# Patient Record
Sex: MALE | Race: WHITE | Employment: FULL TIME | URBAN - METROPOLITAN AREA
[De-identification: names, ages, dates, MRNs, and addresses within clinical notes are randomized per-mention and may not be internally consistent; named-entity substitution may affect disease eponyms.]

---

## 2019-05-21 ENCOUNTER — HOSPITAL ENCOUNTER (EMERGENCY)
Age: 69
Discharge: HOME | End: 2019-05-21
Attending: EMERGENCY MEDICINE
Payer: MEDICARE

## 2019-05-21 VITALS
TEMPERATURE: 98 F | DIASTOLIC BLOOD PRESSURE: 51 MMHG | HEART RATE: 72 BPM | SYSTOLIC BLOOD PRESSURE: 104 MMHG | WEIGHT: 206 LBS | BODY MASS INDEX: 31.22 KG/M2 | OXYGEN SATURATION: 99 % | RESPIRATION RATE: 16 BRPM | HEIGHT: 68 IN

## 2019-05-21 DIAGNOSIS — H65.00 ACUTE SEROUS OTITIS MEDIA, RECURRENCE NOT SPECIFIED, UNSPECIFIED LATERALITY: Primary | ICD-10-CM

## 2019-05-21 PROCEDURE — 99282 EMERGENCY DEPT VISIT SF MDM: CPT

## 2019-05-21 RX ORDER — AMOXICILLIN 250 MG/1
500 CAPSULE ORAL 3 TIMES DAILY
Qty: 30 CAPSULE | Refills: 0 | Status: SHIPPED | OUTPATIENT
Start: 2019-05-21 | End: 2019-05-31

## 2019-05-21 RX ORDER — DOXYCYCLINE 50 MG/1
CAPSULE ORAL DAILY
COMMUNITY

## 2019-05-21 RX ORDER — LISINOPRIL 5 MG/1
5 TABLET ORAL DAILY
COMMUNITY

## 2019-05-21 ASSESSMENT — PAIN SCALES - GENERAL: PAINLEVEL_OUTOF10: 2

## 2019-05-21 ASSESSMENT — PAIN DESCRIPTION - ORIENTATION: ORIENTATION: RIGHT

## 2019-05-21 ASSESSMENT — PAIN DESCRIPTION - LOCATION: LOCATION: EAR

## 2019-05-21 ASSESSMENT — PAIN DESCRIPTION - PAIN TYPE: TYPE: ACUTE PAIN

## 2019-05-21 ASSESSMENT — PAIN DESCRIPTION - DESCRIPTORS: DESCRIPTORS: ACHING

## 2019-05-21 NOTE — ED TRIAGE NOTES
Chief Complaint   Patient presents with    Otalgia     pt c/o right ear pain swelling and drainage x 2 weeks, difficulty hearing
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
## 2021-12-10 ENCOUNTER — OFFICE VISIT (OUTPATIENT)
Dept: ORTHOPEDIC SURGERY | Age: 71
End: 2021-12-10
Payer: MEDICARE

## 2021-12-10 VITALS — WEIGHT: 209 LBS | BODY MASS INDEX: 31.67 KG/M2 | HEIGHT: 68 IN

## 2021-12-10 DIAGNOSIS — M72.0 DUPUYTREN'S CONTRACTURE OF HAND: Primary | ICD-10-CM

## 2021-12-10 PROCEDURE — 99202 OFFICE O/P NEW SF 15 MIN: CPT | Performed by: ORTHOPAEDIC SURGERY

## 2021-12-10 NOTE — PATIENT INSTRUCTIONS
Patient Education     Dr. Rocco Rojas or Dr. Rashad Epps -- hand specialists. Dupuytren's Contracture: Care Instructions  Your Care Instructions     In Dupuytren's contracture, the fingers become stiff and curl toward the palm. It is caused by thick tissue that grows under the skin in the palm of the hand. Sometimes the condition affects the palm but not the fingers. If the tissue gets thicker and affects one or more fingers, it may limit movement of your fingers and hand. Sometimes the condition can occur in the soles of the feet. The cause of Dupuytren's disease is not known. Dupuytren's disease may get worse slowly. If you have mild Dupuytren's disease, you may be able to keep your fingers moving with regular stretching. Surgery usually helps in severe cases. However, Dupuytren's disease can come back. Follow-up care is a key part of your treatment and safety. Be sure to make and go to all appointments, and call your doctor if you are having problems. It's also a good idea to know your test results and keep a list of the medicines you take. How can you care for yourself at home? · Follow your doctor's advice for physical or occupational therapy and exercises to put your fingers and hand through a range of motion. · Two times a day, massage your hand and gently stretch the fingers back. This can get rid of tightness and help keep your fingers flexible. · Try to avoid curling your hand tightly. For example, use utensils and tools that have larger hand . When should you call for help? Call your doctor now or seek immediate medical care if:    · You have numbness in your fingers.     · You have a wound or sore on your finger or palm.     · Your hand or fingers get worse. Watch closely for changes in your health, and be sure to contact your doctor if you have any problems. Where can you learn more? Go to [email or web address] org and sign in to your [employer] account.  Enter
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
## 2021-12-16 ENCOUNTER — HOSPITAL ENCOUNTER (OUTPATIENT)
Dept: VASCULAR LAB | Age: 71
Discharge: HOME | End: 2021-12-16
Payer: OTHER GOVERNMENT

## 2021-12-16 DIAGNOSIS — H34.9 OCCLUSION, RETINAL, ARTERIAL: ICD-10-CM

## 2021-12-16 PROCEDURE — 93880 EXTRACRANIAL BILAT STUDY: CPT

## 2024-06-19 ENCOUNTER — HOSPITAL ENCOUNTER (EMERGENCY)
Age: 74
Discharge: HOME | End: 2024-06-19
Attending: EMERGENCY MEDICINE
Payer: OTHER GOVERNMENT

## 2024-06-19 VITALS
SYSTOLIC BLOOD PRESSURE: 136 MMHG | TEMPERATURE: 98 F | WEIGHT: 205 LBS | RESPIRATION RATE: 16 BRPM | HEIGHT: 68 IN | DIASTOLIC BLOOD PRESSURE: 71 MMHG | OXYGEN SATURATION: 96 % | BODY MASS INDEX: 31.07 KG/M2 | HEART RATE: 69 BPM

## 2024-06-19 DIAGNOSIS — H60.12 CELLULITIS OF LEFT EAR: Primary | ICD-10-CM

## 2024-06-19 PROCEDURE — 99283 EMERGENCY DEPT VISIT LOW MDM: CPT

## 2024-06-19 PROCEDURE — 6370000000 HC RX 637 (ALT 250 FOR IP): Performed by: EMERGENCY MEDICINE

## 2024-06-19 RX ORDER — CLINDAMYCIN HYDROCHLORIDE 150 MG/1
300 CAPSULE ORAL ONCE
Status: COMPLETED | OUTPATIENT
Start: 2024-06-19 | End: 2024-06-19

## 2024-06-19 RX ORDER — CLINDAMYCIN HYDROCHLORIDE 300 MG/1
300 CAPSULE ORAL 3 TIMES DAILY
Qty: 21 CAPSULE | Refills: 0 | Status: SHIPPED | OUTPATIENT
Start: 2024-06-19 | End: 2024-06-26

## 2024-06-19 RX ADMIN — CLINDAMYCIN HYDROCHLORIDE 300 MG: 150 CAPSULE ORAL at 09:19

## 2024-06-19 ASSESSMENT — LIFESTYLE VARIABLES
HOW OFTEN DO YOU HAVE A DRINK CONTAINING ALCOHOL: NEVER
HOW MANY STANDARD DRINKS CONTAINING ALCOHOL DO YOU HAVE ON A TYPICAL DAY: PATIENT DOES NOT DRINK

## 2024-06-19 ASSESSMENT — PAIN - FUNCTIONAL ASSESSMENT: PAIN_FUNCTIONAL_ASSESSMENT: NONE - DENIES PAIN
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
